# Patient Record
Sex: FEMALE | Race: WHITE
[De-identification: names, ages, dates, MRNs, and addresses within clinical notes are randomized per-mention and may not be internally consistent; named-entity substitution may affect disease eponyms.]

---

## 2017-05-07 ENCOUNTER — HOSPITAL ENCOUNTER (EMERGENCY)
Dept: HOSPITAL 25 - UCCORT | Age: 22
Discharge: HOME | End: 2017-05-07
Payer: COMMERCIAL

## 2017-05-07 VITALS — DIASTOLIC BLOOD PRESSURE: 59 MMHG | SYSTOLIC BLOOD PRESSURE: 111 MMHG

## 2017-05-07 DIAGNOSIS — F41.9: ICD-10-CM

## 2017-05-07 DIAGNOSIS — J03.90: Primary | ICD-10-CM

## 2017-05-07 PROCEDURE — G0463 HOSPITAL OUTPT CLINIC VISIT: HCPCS

## 2017-05-07 PROCEDURE — 99212 OFFICE O/P EST SF 10 MIN: CPT

## 2017-05-07 PROCEDURE — 87651 STREP A DNA AMP PROBE: CPT

## 2017-05-07 NOTE — UC
Throat Pain/Nasal Ramon HPI





- HPI Summary


HPI Summary: 





Pt presents with c/o of sore throat, nasal congestion and sinus pressure and 

generalized malaise X 3-4 days.  





- History of Current Complaint


Chief Complaint: UCGeneralIllness


Stated Complaint: THROAT


Time Seen by Provider: 05/07/17 20:43


Hx Obtained From: Patient


Hx Last Menstrual Period: 1/2017 - has implanon


Pregnant?: No


Onset/Duration: Sudden Onset, Lasting Days - 3-4 days


Severity: Moderate


Cough: Nonproductive


Associated Signs & Symptoms: Positive: Dysphagia


Related History: Seasonal Allergies





- Allergies/Home Medications


Allergies/Adverse Reactions: 


 Allergies











Allergy/AdvReac Type Severity Reaction Status Date / Time


 


No Known Allergies Allergy   Verified 05/07/17 20:53











Home Medications: 


 Home Medications





Cetirizine* [ZyrTEC 10 MG TAB*] 10 mg PO DAILY 05/07/17 [History Confirmed 05/07 /17]











PMH/Surg Hx/FS Hx/Imm Hx


Previously Healthy: Yes


Endocrine History Of: 


   Denies: Diabetes, Thyroid Disease, Hyperthyroidism, Hypothyroidism, 

Dyslipidemia


Cardiovascular History Of: 


   Denies: Cardiac Disorders, Hypertension, Pacemaker/ICD, Myocardial Infarction

, Congestive Heart Failure, Atrial Fibrillation, Deep Vein Thrombosis, Bleeding 

Disorders


Respiratory History Of: 


   Denies: COPD, Asthma, Bronchitis, Pneumonia, Pulmonary Embolism


GI/ History Of: 


   Denies: Gastroesophageal Reflux, Ulcer, Gastrointestinal Bleed, Gall Bladder 

Disease, Kidney Stones, Diverticulitis, Renal Disease, Urosepsis


Neurological History Of: 


   Denies: TIA, CVA, Dementia, Seizures, Migraine


Psychological History Of: Reports: Anxiety - She has not been on treatment 

before.


   Denies: Depression, Bipolar Disorder, Schizophrenia, Post Traumatic Stress 

Disorder


Cancer History Of: 


   Denies: Lung Cancer, Colorectal Cancer, Breast Cancer, Prostate Cancer, 

Cervical Cancer


Other History Of: 


   Negative For: HIV, Hepatitis B, Hepatitis C, Anticoagulant Therapy





- Surgical History


Surgical History: Yes


Surgery Procedure, Year, and Place: Tonsillectomy





- Family History


Known Family History: 


   Negative: Cardiac Disease, Hypertension





- Social History


Occupation: Student - at college in [place]


Alcohol Use: Occasionally


Substance Use Type: None


Smoking Status (MU): Never Smoked Tobacco





- Immunization History


Most Recent Influenza Vaccination: Not the 2015/2016 Season





Review of Systems


Constitutional: Fatigue


Skin: Negative


Eyes: Negative


ENT: Sore Throat


Respiratory: Cough


Cardiovascular: Negative


Gastrointestinal: Negative


Genitourinary: Negative


Motor: Negative


Neurovascular: Negative


Musculoskeletal: Negative


Neurological: Headache


Psychological: Negative


All Other Systems Reviewed And Are Negative: Yes





Physical Exam


Triage Information Reviewed: Yes


Appearance: Ill-Appearing


Vital Signs: 


 Initial Vital Signs











Temp  98.6 F   05/07/17 20:54


 


Pulse  66   05/07/17 20:54


 


Resp  16   05/07/17 20:54


 


BP  111/59   05/07/17 20:54


 


Pulse Ox  100   05/07/17 20:54











Eye Exam: Normal


ENT Exam: Other


ENT: Positive: Nasal congestion, Tonsillar swelling


Neck exam: Normal


Respiratory Exam: Normal


Cardiovascular Exam: Normal


Abdominal Exam: Normal


Musculoskeletal Exam: Normal


Neurological Exam: Normal


Psychological Exam: Normal


Skin Exam: Normal





Throat Pain/Nasal Course/Dx





- Differential Dx/Diagnosis


Differential Diagnosis/HQI/PQRI: Influenza, Mononucleosis, Pharyngitis, 

Sinusitis, Tonsillitis, URI


Provider Diagnoses: tonsillitis





Discharge





- Discharge Plan


Condition: Stable


Disposition: HOME


Prescriptions: 


Amoxicillin CAP* [Amoxicillin 500 MG CAP*] 500 mg PO Q12H #14 cap


Patient Education Materials:  Tonsillitis (ED)


Referrals: 


Viviana Del Rosario MD [Medical Doctor] - If Needed (Please follow p with your 

PCP or return to clinic as needed. )

## 2017-06-19 ENCOUNTER — HOSPITAL ENCOUNTER (EMERGENCY)
Dept: HOSPITAL 25 - UCCORT | Age: 22
Discharge: HOME | End: 2017-06-19
Payer: COMMERCIAL

## 2017-06-19 VITALS — SYSTOLIC BLOOD PRESSURE: 120 MMHG | DIASTOLIC BLOOD PRESSURE: 78 MMHG

## 2017-06-19 DIAGNOSIS — N39.0: Primary | ICD-10-CM

## 2017-06-19 DIAGNOSIS — Z87.440: ICD-10-CM

## 2017-06-19 DIAGNOSIS — Z32.02: ICD-10-CM

## 2017-06-19 DIAGNOSIS — F41.9: ICD-10-CM

## 2017-06-19 PROCEDURE — 81015 MICROSCOPIC EXAM OF URINE: CPT

## 2017-06-19 PROCEDURE — 81003 URINALYSIS AUTO W/O SCOPE: CPT

## 2017-06-19 PROCEDURE — G0463 HOSPITAL OUTPT CLINIC VISIT: HCPCS

## 2017-06-19 PROCEDURE — 87086 URINE CULTURE/COLONY COUNT: CPT

## 2017-06-19 PROCEDURE — 84702 CHORIONIC GONADOTROPIN TEST: CPT

## 2017-06-19 PROCEDURE — 99212 OFFICE O/P EST SF 10 MIN: CPT

## 2018-04-09 ENCOUNTER — HOSPITAL ENCOUNTER (EMERGENCY)
Dept: HOSPITAL 25 - UCCORT | Age: 23
Discharge: HOME | End: 2018-04-09
Payer: COMMERCIAL

## 2018-04-09 VITALS — DIASTOLIC BLOOD PRESSURE: 77 MMHG | SYSTOLIC BLOOD PRESSURE: 108 MMHG

## 2018-04-09 DIAGNOSIS — Z32.02: ICD-10-CM

## 2018-04-09 DIAGNOSIS — J02.0: Primary | ICD-10-CM

## 2018-04-09 DIAGNOSIS — N39.0: ICD-10-CM

## 2018-04-09 PROCEDURE — 84702 CHORIONIC GONADOTROPIN TEST: CPT

## 2018-04-09 PROCEDURE — 87086 URINE CULTURE/COLONY COUNT: CPT

## 2018-04-09 PROCEDURE — 81003 URINALYSIS AUTO W/O SCOPE: CPT

## 2018-04-09 PROCEDURE — 87651 STREP A DNA AMP PROBE: CPT

## 2018-04-09 PROCEDURE — G0463 HOSPITAL OUTPT CLINIC VISIT: HCPCS

## 2018-04-09 PROCEDURE — 99212 OFFICE O/P EST SF 10 MIN: CPT

## 2018-05-30 NOTE — UC
Throat Pain/Nasal Ramon HPI





- HPI Summary


HPI Summary: 





Pt c/o sudden onset of sore throat that began on 4/5/18 and has worsened since 

onset.  2. Pt c/o urinary symptoms of frequency, urgency and dysuria. 





- History of Current Complaint


Chief Complaint: UCRespiratory


Stated Complaint: SORE THROAT/FEMALE COMP


Time Seen by Provider: 04/09/18 12:13


Hx Obtained From: Patient


Hx Last Menstrual Period: 2/2018


Pregnant?: No - pt has nexplanon, menses irregular


Onset/Duration: Sudden Onset - urinary c/o and ST


Severity: Moderate


Pain Intensity: 6


Associated Signs & Symptoms: Positive: Dysphagia





- Epiglottits Risk Factors


Epiglottis Risk Factors: Negative





- Allergies/Home Medications


Allergies/Adverse Reactions: 


 Allergies











Allergy/AdvReac Type Severity Reaction Status Date / Time


 


No Known Allergies Allergy   Verified 04/09/18 12:20














PMH/Surg Hx/FS Hx/Imm Hx


Previously Healthy: Yes


Other History Of: 


   Negative For: HIV, Hepatitis B, Hepatitis C, Anticoagulant Therapy





- Surgical History


Surgical History: Yes


Surgery Procedure, Year, and Place: Tonsillectomy





- Family History


Known Family History: 


   Negative: Cardiac Disease, Hypertension





- Social History


Occupation: Employed Full-time


Lives: With Family


Alcohol Use: Weekly


Alcohol Amount: 3 average


Substance Use Type: None


Smoking Status (MU): Never Smoked Tobacco


Have You Smoked in the Last Year: No





- Immunization History


Most Recent Influenza Vaccination: Not the 2015/2016 Season





Review of Systems


Constitutional: Fatigue


Skin: Negative


Eyes: Negative


ENT: Sore Throat


Respiratory: Negative


Cardiovascular: Negative


Gastrointestinal: Negative


Genitourinary: Dysuria, Frequency, Urgency


Motor: Negative


Neurovascular: Negative


Musculoskeletal: Negative


Neurological: Negative


Psychological: Negative


Is Patient Immunocompromised?: No


All Other Systems Reviewed And Are Negative: Yes





Physical Exam


Triage Information Reviewed: Yes


Vital Signs: 


 Initial Vital Signs











Temp  98.1 F   04/09/18 12:06


 


Pulse  85   04/09/18 12:06


 


Resp  18   04/09/18 12:06


 


BP  108/77   04/09/18 12:06


 


Pulse Ox  100   04/09/18 12:06











Vital Signs Reviewed: Yes


Eye Exam: Normal


ENT Exam: Other


ENT: Positive: Pharyngeal erythema


Dental Exam: Normal


Neck exam: Other


Neck: Positive: Enlarged Nodes @ - left submandibular


Respiratory Exam: Normal


Cardiovascular Exam: Normal


Musculoskeletal Exam: Normal


Neurological Exam: Normal


Psychological Exam: Normal


Skin Exam: Normal





Diagnostics





- Laboratory


Diagnostic Studies Completed/Ordered: Rapid strep: positive





Throat Pain/Nasal Course/Dx





- Differential Dx/Diagnosis


Differential Diagnosis/HQI/PQRI: Pharyngitis, Tonsillitis, Other - UTI


Provider Diagnoses: UTI.  Strep Throat





Discharge





- Sign-Out/Discharge


Documenting (check all that apply): Discharge





- Discharge Plan


Condition: Stable


Disposition: HOME


Prescriptions: 


Cephalexin CAP* [Keflex 500 CAP*] 500 mg PO QID #40 cap


Fluconazole 100 MG TAB* [Diflucan 100 MG TAB*] 100 mg PO DAILY #2 tab


Phenazopyridine TAB* [Pyridium 100 mg TAB*] 100 mg PO TID #3 tab


Patient Education Materials:  Urinary Tract Infection in Women (DC), Strep 

Throat (ED)


Referrals: 


[facility] [[facility]]


No Primary Care Phys,NOPCP [Primary Care Provider] - 





- Billing Disposition and Condition


Condition: STABLE


Disposition: HOME
Will order CT scan, Zofran, morphine, fluids, UA, labs.